# Patient Record
Sex: MALE | Race: OTHER | Employment: FULL TIME | URBAN - METROPOLITAN AREA
[De-identification: names, ages, dates, MRNs, and addresses within clinical notes are randomized per-mention and may not be internally consistent; named-entity substitution may affect disease eponyms.]

---

## 2024-08-11 ENCOUNTER — HOSPITAL ENCOUNTER (EMERGENCY)
Age: 49
Discharge: HOME | End: 2024-08-11
Attending: EMERGENCY MEDICINE
Payer: COMMERCIAL

## 2024-08-11 VITALS
HEIGHT: 68 IN | TEMPERATURE: 98 F | HEART RATE: 70 BPM | SYSTOLIC BLOOD PRESSURE: 121 MMHG | DIASTOLIC BLOOD PRESSURE: 80 MMHG | WEIGHT: 188 LBS | RESPIRATION RATE: 16 BRPM | BODY MASS INDEX: 28.49 KG/M2 | OXYGEN SATURATION: 97 %

## 2024-08-11 DIAGNOSIS — Z98.890 S/P MEDIAL MENISCAL REPAIR: ICD-10-CM

## 2024-08-11 DIAGNOSIS — Z09 POSTOP CHECK: Primary | ICD-10-CM

## 2024-08-11 PROCEDURE — 99283 EMERGENCY DEPT VISIT LOW MDM: CPT

## 2024-08-11 PROCEDURE — 99282 EMERGENCY DEPT VISIT SF MDM: CPT

## 2024-08-11 RX ORDER — ASPIRIN 325 MG
325 TABLET ORAL DAILY
COMMUNITY

## 2024-08-11 RX ORDER — DOCUSATE SODIUM 100 MG/1
100 CAPSULE, LIQUID FILLED ORAL 2 TIMES DAILY
COMMUNITY

## 2024-08-11 RX ORDER — NAPROXEN 500 MG/1
500 TABLET ORAL 2 TIMES DAILY WITH MEALS
COMMUNITY

## 2024-08-12 NOTE — ED INITIAL ASSESSMENT (HPI)
Pt to ED with bleeding from surgical wound to right knee. Pt is s/p meniscus surgery last Thursday, today bleeding restarted. Steri-strips in place, minimal bleeding.

## 2024-08-12 NOTE — ED PROVIDER NOTES
Patient Seen in: [facility] In [place]      History     Chief Complaint   Patient presents with    Postop/Procedure Problem     Stated Complaint: meniscus surgery last thursday    Subjective:     HPI    47 yo M had right knee meniscal surgery 10 days ago at [facility]. He reported an incident at work where he popped something while making a run upstairs. Following the incident, a surgical incision on his knee opened and started bleeding.  He was able to get the bleeding to stop with pressure.  He applied Steri-Strips as well.      Objective:   History reviewed. No pertinent past medical history.           Past Surgical History:   Procedure Laterality Date    Anterior cruciate ligament repair Left     Repair ing hernia,4+y/o,reducibl      Tmj meniscectomy Right                 Social History     Socioeconomic History    Marital status:    Tobacco Use    Smoking status: Never    Smokeless tobacco: Never   Vaping Use    Vaping status: Never Used   Substance and Sexual Activity    Alcohol use: Never    Drug use: Never              Review of Systems    Positive for stated complaint: meniscus surgery last thursday  Other systems are as noted in HPI.  Constitutional and vital signs reviewed.      All other systems reviewed and negative except as noted above.    Physical Exam     ED Triage Vitals [08/11/24 2155]   /61   Pulse 79   Resp 16   Temp 97.7 °F (36.5 °C)   Temp src Oral   SpO2 97 %   O2 Device None (Room air)       Current:/61   Pulse 79   Temp 97.7 °F (36.5 °C) (Oral)   Resp 16   Ht 172.7 cm (5' 8\")   Wt 85.3 kg   SpO2 97%   BMI 28.59 kg/m²       General:  Vitals as listed.  No acute distress   Extremities: Right knee effusion.  No active bleeding from arthroscopy sites.  No surrounding erythema.  Neuro: Alert oriented and nonfocal        ED COURSE and MDM     Wound cleansed.  No active bleeding.  Steri-Strips reapplied.  Mena dressing applied.    Patient advised to stop  taking aspirin for 1 week and refrain from physical therapy for 1 week.   He was put in a Mena dressing which should stay on for 1 week.  He is to wear his knee brace as well.    He was advised to call his orthopedic surgeon for an update on what is going on.    I have discussed with the patient the results of testing, differential diagnosis, and treatment plan. They expressed clear understanding of these instructions and agrees to the plan provided.    Disposition and Plan     Clinical Impression:  1. Postop check    2. S/P medial meniscal repair         Disposition:  Discharge  8/11/2024 10:56 pm    Follow-up:  [facility] in [place]  [address] [place] [id number]  [phone]  Follow up  As needed        Medications Prescribed:  Current Discharge Medication List

## 2024-08-12 NOTE — DISCHARGE INSTRUCTIONS
Keep Ace wrap in place for 3 days.      Wear your knee brace for one week    Stop taking aspirin for 1 week    Hold off on physical therapy for 1 week    Follow-up with your [facility] orthopedic surgeon next week.